# Patient Record
Sex: FEMALE | Race: WHITE
[De-identification: names, ages, dates, MRNs, and addresses within clinical notes are randomized per-mention and may not be internally consistent; named-entity substitution may affect disease eponyms.]

---

## 2019-03-09 ENCOUNTER — HOSPITAL ENCOUNTER (EMERGENCY)
Dept: HOSPITAL 62 - ER | Age: 19
Discharge: HOME | End: 2019-03-09
Payer: COMMERCIAL

## 2019-03-09 VITALS — SYSTOLIC BLOOD PRESSURE: 117 MMHG | DIASTOLIC BLOOD PRESSURE: 72 MMHG

## 2019-03-09 DIAGNOSIS — R50.9: ICD-10-CM

## 2019-03-09 DIAGNOSIS — R05: ICD-10-CM

## 2019-03-09 DIAGNOSIS — R00.0: ICD-10-CM

## 2019-03-09 DIAGNOSIS — J18.9: Primary | ICD-10-CM

## 2019-03-09 DIAGNOSIS — R53.83: ICD-10-CM

## 2019-03-09 DIAGNOSIS — Z79.3: ICD-10-CM

## 2019-03-09 DIAGNOSIS — R07.89: ICD-10-CM

## 2019-03-09 DIAGNOSIS — F41.9: ICD-10-CM

## 2019-03-09 LAB
A TYPE INFLUENZA AG: NEGATIVE
ADD MANUAL DIFF: NO
ANION GAP SERPL CALC-SCNC: 11 MMOL/L (ref 5–19)
APPEARANCE UR: (no result)
APTT PPP: YELLOW S
B INFLUENZA AG: NEGATIVE
BASOPHILS # BLD AUTO: 0 10^3/UL (ref 0–0.2)
BASOPHILS NFR BLD AUTO: 0.3 % (ref 0–2)
BILIRUB UR QL STRIP: NEGATIVE
BUN SERPL-MCNC: 9 MG/DL (ref 7–20)
CALCIUM: 9.7 MG/DL (ref 8.4–10.2)
CHLORIDE SERPL-SCNC: 101 MMOL/L (ref 98–107)
CO2 SERPL-SCNC: 27 MMOL/L (ref 22–30)
EOSINOPHIL # BLD AUTO: 0 10^3/UL (ref 0–0.6)
EOSINOPHIL NFR BLD AUTO: 0.4 % (ref 0–6)
ERYTHROCYTE [DISTWIDTH] IN BLOOD BY AUTOMATED COUNT: 13.5 % (ref 11.5–14)
GLUCOSE SERPL-MCNC: 106 MG/DL (ref 75–110)
GLUCOSE UR STRIP-MCNC: NEGATIVE MG/DL
HCT VFR BLD CALC: 34.6 % (ref 36–47)
HGB BLD-MCNC: 11.8 G/DL (ref 12–15.5)
KETONES UR STRIP-MCNC: (no result) MG/DL
LYMPHOCYTES # BLD AUTO: 1.6 10^3/UL (ref 0.5–4.7)
LYMPHOCYTES NFR BLD AUTO: 13.4 % (ref 13–45)
MCH RBC QN AUTO: 28.7 PG (ref 27–33.4)
MCHC RBC AUTO-ENTMCNC: 34.1 G/DL (ref 32–36)
MCV RBC AUTO: 84 FL (ref 80–97)
MONOCYTES # BLD AUTO: 1.1 10^3/UL (ref 0.1–1.4)
MONOCYTES NFR BLD AUTO: 9.4 % (ref 3–13)
NEUTROPHILS # BLD AUTO: 8.9 10^3/UL (ref 1.7–8.2)
NEUTS SEG NFR BLD AUTO: 76.5 % (ref 42–78)
NITRITE UR QL STRIP: NEGATIVE
PH UR STRIP: 5 [PH] (ref 5–9)
PLATELET # BLD: 361 10^3/UL (ref 150–450)
POTASSIUM SERPL-SCNC: 4.3 MMOL/L (ref 3.6–5)
PROT UR STRIP-MCNC: NEGATIVE MG/DL
RBC # BLD AUTO: 4.11 10^6/UL (ref 3.72–5.28)
SODIUM SERPL-SCNC: 139 MMOL/L (ref 137–145)
SP GR UR STRIP: 1.02
TOTAL CELLS COUNTED % (AUTO): 100 %
UROBILINOGEN UR-MCNC: NEGATIVE MG/DL (ref ?–2)
WBC # BLD AUTO: 11.6 10^3/UL (ref 4–10.5)

## 2019-03-09 PROCEDURE — 81001 URINALYSIS AUTO W/SCOPE: CPT

## 2019-03-09 PROCEDURE — 96361 HYDRATE IV INFUSION ADD-ON: CPT

## 2019-03-09 PROCEDURE — 93010 ELECTROCARDIOGRAM REPORT: CPT

## 2019-03-09 PROCEDURE — 87804 INFLUENZA ASSAY W/OPTIC: CPT

## 2019-03-09 PROCEDURE — 80048 BASIC METABOLIC PNL TOTAL CA: CPT

## 2019-03-09 PROCEDURE — 99285 EMERGENCY DEPT VISIT HI MDM: CPT

## 2019-03-09 PROCEDURE — 96375 TX/PRO/DX INJ NEW DRUG ADDON: CPT

## 2019-03-09 PROCEDURE — 36415 COLL VENOUS BLD VENIPUNCTURE: CPT

## 2019-03-09 PROCEDURE — 85025 COMPLETE CBC W/AUTO DIFF WBC: CPT

## 2019-03-09 PROCEDURE — 85379 FIBRIN DEGRADATION QUANT: CPT

## 2019-03-09 PROCEDURE — 84484 ASSAY OF TROPONIN QUANT: CPT

## 2019-03-09 PROCEDURE — 93005 ELECTROCARDIOGRAM TRACING: CPT

## 2019-03-09 PROCEDURE — 71046 X-RAY EXAM CHEST 2 VIEWS: CPT

## 2019-03-09 PROCEDURE — 96374 THER/PROPH/DIAG INJ IV PUSH: CPT

## 2019-03-09 PROCEDURE — 81025 URINE PREGNANCY TEST: CPT

## 2019-03-09 NOTE — EKG REPORT
SEVERITY:- OTHERWISE NORMAL ECG -

SINUS TACHYCARDIA

:

Confirmed by: Anna Lehman MD 09-Mar-2019 22:17:28

## 2019-03-09 NOTE — RADIOLOGY REPORT (SQ)
EXAM DESCRIPTION:  CHEST 2 VIEWS



COMPLETED DATE/TIME:  3/9/2019 2:12 pm



REASON FOR STUDY:  chest pain



COMPARISON:  None.



EXAM PARAMETERS:  NUMBER OF VIEWS: two views

TECHNIQUE: Digital Frontal and Lateral radiographic views of the chest acquired.

RADIATION DOSE: NA

LIMITATIONS: none



FINDINGS:  LUNGS AND PLEURA: Patchy airspace opacities are noted at the right perihilar region.  No p
neumothorax or pleural effusion.

MEDIASTINUM AND HILAR STRUCTURES: No masses or contour abnormalities.

HEART AND VASCULAR STRUCTURES: Heart normal size.  No evidence for failure.

BONES: No acute findings.

HARDWARE: None in the chest.



IMPRESSION:  Patchy airspace opacities at the right perihilar region, may represent developing pneumo
sera or asymmetric pulmonary edema.



TECHNICAL DOCUMENTATION:  JOB ID:  [id number]

[id number]

 2011 [email or web address]- All Rights Reserved



Reading location - IP/workstation name: [email or web address]

## 2019-03-09 NOTE — ER DOCUMENT REPORT
ED General





- General


Chief Complaint: Chest Pain


Stated Complaint: CHEST PAIN


Time Seen by Provider: 03/09/19 13:14


Primary Care Provider: 


REKHA YEH MD [ACTIVE STAFF] - Follow up as needed


Mode of Arrival: Ambulatory


Information source: Patient, Parent


Notes: 





18-year-old female with no reported past medical history presents with complaint

of chest pain that started 1 hour prior to arrival while moving out of her 

house.  Pain is right-sided described as tightness, sharp, intermittent.  

Patient does admit that 4 days prior to arrival she experienced body aches, sore

throat, headache, intermittent cough.  Patient denies prior similar symptoms.  

She did receive 0.25 mg of Ativan prior to arrival.  Patient has had sick 

contacts with similar symptoms at home.  She is currently on birth control.  She

denies any leg swelling, history of PE, DVT, trauma to the chest





- HPI


Onset: This morning


Onset/Duration: Sudden, Intermittent


Quality of pain: Sharp, Other - Tightness


Severity: Moderate


Associated symptoms: Body/muscle aches, Chest pain, Nonproductive cough, Fever, 

Headache - Resolved last week, Shortness of breath, Sore throat - Resolved last 

week.  denies: Hurts to breath, Leg swelling, Nausea, Vomiting


Exacerbated by: Movement


Relieved by: Denies


Similar symptoms previously: No


Recently seen / treated by doctor: No





- Related Data


Allergies/Adverse Reactions: 


                                        





No Known Allergies Allergy (Unverified 03/09/19 12:46)


   











Past Medical History





- General


Information source: Patient, Parent, [facility] Records





- Social History


Smoking Status: Never Smoker


Chew tobacco use (# tins/day): No


Frequency of alcohol use: None


Drug Abuse: None


Lives with: Family


Family History: Reviewed & Not Pertinent


Patient has suicidal ideation: No


Patient has homicidal ideation: No





- Medical History


Medical History: Negative


Renal/ Medical History: Denies: Hx Peritoneal Dialysis





Review of Systems





- Review of Systems


Notes: 





REVIEW OF SYSTEMS:


CONSTITUTIONAL :  Denies fever,  chills, or sweats.   Denies weight loss, recent

hospitalizations. 


EENT: Denies visual changes, eye pain.  Denies sore throat, oral lesions, 

difficulty swallowing.


CARDIOVASCULAR:    Denies palpitations. Denies lower extremity edema.


RESPIRATORY:  Denies cough. Denies shortness of breath, wheezing.


GASTROINTESTINAL:  Denies abdominal pain or distention.  Denies nausea, 

vomiting, or diarrhea.  Denies blood in vomitus, stools, or per rectum.  Denies 

black, tarry stools.  Denies constipation.  


GENITOURINARY:  Denies difficulty urinating, painful urination,  frequency, 

blood in urine, or  vaginal discharge.


MUSCULOSKELETAL:  Denies back or neck pain or stiffness.  Denies joint pain or 

swelling.


SKIN:   Denies rash, lesions or sores.


HEMATOLOGIC :   Denies easy bruising or bleeding.


LYMPHATIC:  Denies swollen glands.


NEUROLOGICAL:  Denies confusion or altered mental status.  Denies loss of 

consciousness.  Denies dizziness or lightheadedness.  Denies headache.  Denies 

weakness or paralysis.  Denies problems difficulty with ambulation, slurred 

speech.  Denies sensory loss, numbness, or tingling.  Denies seizures.


PSYCHIATRIC:    Denies depression, suicidal ideation, or homicidal ideation.  

Denies visual or auditory hallucinations.








Physical Exam





- Vital signs


Vitals: 


                                        











Temp Pulse Resp BP Pulse Ox


 


 100.2 F   112 H  16   126/86 H  100 


 


 03/09/19 12:57  03/09/19 12:57  03/09/19 12:57  03/09/19 12:57  03/09/19 12:57














- Notes


Notes: 





PHYSICAL EXAMINATION:





GENERAL: Tearful, moderate distress





HEAD: Atraumatic, normocephalic.





EYES: Pupils equal round and reactive to light, extraocular movements intact, 

conjunctiva are normal.





ENT: Nares patent, oropharynx clear without exudates.  Moist mucous membranes.





NECK: Normal range of motion, supple without lymphadenopathy





LUNGS: Breath sounds clear to auscultation bilaterally and equal.  No wheezes 

rales or rhonchi.





HEART: Tachycardic, regular rhythm





ABDOMEN: Soft, nontender, nondistended abdomen.  No guarding, no rebound.  No 

masses appreciated.





Female : deferred





Musculoskeletal: Normal range of motion, no pitting or edema.  No cyanosis.





NEUROLOGICAL: Cranial nerves grossly intact.  Normal speech, normal gait.  

Normal sensory, motor exams





PSYCH: Normal mood, normal affect.





SKIN: Warm, Dry, normal turgor, no rashes or lesions noted.





Course





- Re-evaluation


Re-evalutation: 





03/09/19 19:34





Laboratory











  03/09/19 03/09/19 03/09/19





  13:34 13:34 13:34


 


WBC  11.6 H  


 


RBC  4.11  


 


Hgb  11.8 L  


 


Hct  34.6 L  


 


MCV  84  


 


MCH  28.7  


 


MCHC  34.1  


 


RDW  13.5  


 


Plt Count  361  


 


Seg Neutrophils %  76.5  


 


Lymphocytes %  13.4  


 


Monocytes %  9.4  


 


Eosinophils %  0.4  


 


Basophils %  0.3  


 


Absolute Neutrophils  8.9 H  


 


Absolute Lymphocytes  1.6  


 


Absolute Monocytes  1.1  


 


Absolute Eosinophils  0.0  


 


Absolute Basophils  0.0  


 


D-Dimer   0.33 


 


Sodium    139.0


 


Potassium    4.3


 


Chloride    101


 


Carbon Dioxide    27


 


Anion Gap    11


 


BUN    9


 


Creatinine    0.59


 


Est GFR ( Amer)    > 60


 


Est GFR (Non-Af Amer)    > 60


 


Glucose    106


 


Calcium    9.7


 


Troponin I   


 


Urine Color   


 


Urine Appearance   


 


Urine pH   


 


Ur Specific Gravity   


 


Urine Protein   


 


Urine Glucose (UA)   


 


Urine Ketones   


 


Urine Blood   


 


Urine Nitrite   


 


Urine Bilirubin   


 


Urine Urobilinogen   


 


Ur Leukocyte Esterase   


 


Urine WBC (Auto)   


 


Urine RBC (Auto)   


 


Squamous Epi Cells Auto   


 


Urine Mucus (Auto)   


 


Urine Ascorbic Acid   


 


Urine HCG, Qual   


 


Influenza A (Rapid)   


 


Influenza B (Rapid)   














  03/09/19 03/09/19 03/09/19





  13:34 13:54 15:45


 


WBC   


 


RBC   


 


Hgb   


 


Hct   


 


MCV   


 


MCH   


 


MCHC   


 


RDW   


 


Plt Count   


 


Seg Neutrophils %   


 


Lymphocytes %   


 


Monocytes %   


 


Eosinophils %   


 


Basophils %   


 


Absolute Neutrophils   


 


Absolute Lymphocytes   


 


Absolute Monocytes   


 


Absolute Eosinophils   


 


Absolute Basophils   


 


D-Dimer   


 


Sodium   


 


Potassium   


 


Chloride   


 


Carbon Dioxide   


 


Anion Gap   


 


BUN   


 


Creatinine   


 


Est GFR ( Amer)   


 


Est GFR (Non-Af Amer)   


 


Glucose   


 


Calcium   


 


Troponin I  < 0.012  


 


Urine Color    YELLOW


 


Urine Appearance    SLIGHTLY-CLOUDY


 


Urine pH    5.0


 


Ur Specific Gravity    1.024


 


Urine Protein    NEGATIVE


 


Urine Glucose (UA)    NEGATIVE


 


Urine Ketones    TRACE H


 


Urine Blood    SMALL H


 


Urine Nitrite    NEGATIVE


 


Urine Bilirubin    NEGATIVE


 


Urine Urobilinogen    NEGATIVE


 


Ur Leukocyte Esterase    NEGATIVE


 


Urine WBC (Auto)    2


 


Urine RBC (Auto)    1


 


Squamous Epi Cells Auto    1


 


Urine Mucus (Auto)    MANY


 


Urine Ascorbic Acid    NEGATIVE


 


Urine HCG, Qual    NEGATIVE


 


Influenza A (Rapid)   NEGATIVE 


 


Influenza B (Rapid)   NEGATIVE 











                                        





Chest X-Ray  03/09/19 13:21


IMPRESSION:  Patchy airspace opacities at the right perihilar region, may 

represent developing pneumonia or asymmetric pulmonary edema.


 











                                        











Temp Pulse Resp BP Pulse Ox


 


 103.2 F H  112 H  18   117/72   100 


 


 03/09/19 16:12  03/09/19 16:12  03/09/19 16:12  03/09/19 16:12  03/09/19 16:12








18-year-old female presents with right-sided chest pain, fever, fatigue.  

Patient has had a mild intermittent cough.  Vital signs reviewed upon arrival 

and patient is tachycardic with a low-grade temperature.  Patient is very 

anxious upon arrival.  Patient did receive IV fluids, morphine, Zofran, Toradol,

doxycycline during her ED course.  On reevaluation patient is feeling better but

has now developed a fever.  Patient tolerating p.o. she is not hypoxic, she has 

no increased work of breathing.  She is a young and healthy female and 

outpatient antibiotic are a reasonable option at this time.  Patient was 

evaluated and treated as appropriate for the patient's presenting symptoms and 

complaint, with consideration of any critical or life threatening conditions 

that may be associated with their obtained history and exam as noted above. All 

results were discussed with patient and her grandmother who is with the patient.

Patient provided the opportunity to ask questions, and express concerns. Patient

was educated on treatments based on their presumed diagnosis as noted above.  At

this time we will discharge the patient with return precautions and follow-up 

recommendations.  Verbal discharge instructions given a the bedside. Medication 

warnings reviewed. Patient is in agreement with this plan and has verbalized 

understanding of return precautions. 





After careful consideration I feel that that patient can be safely discharged 

from the emergency department,  they were advised to followup with a primary ca

re physician in 2-3 days. 








Dictation on this chart was performed using voice recognition software and may 

result in unintended grammatical, spelling, syntax or errors.














03/09/19 19:36








- Vital Signs


Vital signs: 


                                        











Temp Pulse Resp BP Pulse Ox


 


 103.2 F H  112 H  18   117/72   100 


 


 03/09/19 16:12  03/09/19 16:12  03/09/19 16:12  03/09/19 16:12  03/09/19 16:12














- Laboratory


Result Diagrams: 


                                 03/09/19 13:34





                                 03/09/19 13:34


Laboratory results interpreted by me: 


                                        











  03/09/19 03/09/19





  13:34 15:45


 


WBC  11.6 H 


 


Hgb  11.8 L 


 


Hct  34.6 L 


 


Absolute Neutrophils  8.9 H 


 


Urine Ketones   TRACE H


 


Urine Blood   SMALL H














- Diagnostic Test


Radiology reviewed: Image reviewed, Reports reviewed





- EKG Interpretation by Me


EKG shows normal: Sinus rhythm


Rate: Tachycardia


Rhythm: NSR


When compared to previous EKG there are: Previous EKG unavailable





Discharge





- Discharge


Clinical Impression: 


 Chest wall pain, Tachycardia





Community acquired pneumonia


Qualifiers:


 Laterality: right Lung location: unspecified part of lung Qualified Code(s): 

J18.9 - Pneumonia, unspecified organism





Fever


Qualifiers:


 Fever type: unspecified Qualified Code(s): R50.9 - Fever, unspecified





Condition: Good


Disposition: HOME, SELF-CARE


Instructions:  Chest Wall Pain ([facility]), Fever ([facility]), Pneumonia ([facility])


Additional Instructions: 


You have been diagnosed with a pneumonia.  It is very important that you take 

all of your antibiotics until they are gone even if you are feeling better.  

Please return to the emergency department immediately if you began having 

worsening shortness of breath, become confused, have worsening pain, pass out, 

have persistent vomiting that prevents you from being able to drink fluids for 

more than 12 hours, or have any other symptoms that are worrisome to you.  Shaw jo follow-up with your primary care doctor in the next 1-2 days.


Prescriptions: 


Doxycycline Hyclate 100 mg PO BID #14 capsule


Ibuprofen [Motrin 600 Mg Tablet] 600 mg PO TID #15 tablet


Referrals: 


REKHA YEH MD [ACTIVE STAFF] - Follow up as needed